# Patient Record
Sex: FEMALE | Race: WHITE
[De-identification: names, ages, dates, MRNs, and addresses within clinical notes are randomized per-mention and may not be internally consistent; named-entity substitution may affect disease eponyms.]

---

## 2020-06-22 ENCOUNTER — HOSPITAL ENCOUNTER (OUTPATIENT)
Dept: HOSPITAL 76 - DI | Age: 30
Discharge: HOME | End: 2020-06-22
Attending: OBSTETRICS & GYNECOLOGY
Payer: COMMERCIAL

## 2020-06-22 DIAGNOSIS — Z32.01: Primary | ICD-10-CM

## 2020-06-22 PROCEDURE — 76801 OB US < 14 WKS SINGLE FETUS: CPT

## 2020-06-23 NOTE — ULTRASOUND REPORT
PROCEDURE:  OB First Trimester

 

INDICATIONS:  POSITIVE PREGNANCY TEST

 

OUTSIDE/PRIOR DATING DATA:  

Last menstrual period (LMP): 4/11/2020.  

LMP-based estimated date of delivery (KAREEN): 1/16/2021.  

First dating scan (date and location): 6/22/2020.  

Estimated date of delivery (KAREEN) from first dating scan: 1/16/2021.  

 

TECHNIQUE:  

Real-time scanning was performed of the fetus and maternal pelvic organs, with image documentation.  


 

COMPARISON:  None

 

FINDINGS:     

 

Embryo:  Single live intrauterine pregnancy is identified with fetal heart rate of 163 bpm. Crown-rum
p length measures 3.8 cm corresponding to 10 weeks 2 days, compared to initial ultrasound of 10 weeks
 5 days.

 

Measurement variability in dating:  +/- 4 weeks by LMP, +/- 7 days by mean sac diameter (use before 6
 weeks gestation if crown-rump length not able to be measured), +/- 5 days by crown-rump length (6-12
 weeks gestation).  

 

Maternal organs:  Ovaries are unremarkable.  Limited images through the kidneys demonstrate no hydron
ephrosis.  

 

IMPRESSION:  

 

1. Single live intrauterine pregnancy responding to 10 weeks 2 days compared to initial ultrasound of
 10 weeks 5 days. Ultrasound KAREEN is unchanged at 1/16/2021.

 

 

2. Follow-up imaging at 20-22 weeks for dates and anatomy.

 

Reviewed by: Emily Chappell MD on 6/23/2020 1:21 PM PDT

Approved by: Emily Chappell MD on 6/23/2020 1:21 PM PDT

 

 

Station ID:  [id number]

## 2020-06-29 ENCOUNTER — HOSPITAL ENCOUNTER (OUTPATIENT)
Dept: HOSPITAL 76 - LAB.R | Age: 30
Discharge: HOME | End: 2020-06-29
Attending: OBSTETRICS & GYNECOLOGY
Payer: MEDICAID

## 2020-06-29 ENCOUNTER — HOSPITAL ENCOUNTER (OUTPATIENT)
Dept: HOSPITAL 76 - LAB | Age: 30
Discharge: HOME | End: 2020-06-29
Attending: OBSTETRICS & GYNECOLOGY
Payer: MEDICAID

## 2020-06-29 DIAGNOSIS — O99.280: Primary | ICD-10-CM

## 2020-06-29 DIAGNOSIS — Z3A.00: ICD-10-CM

## 2020-06-29 DIAGNOSIS — Z36.89: ICD-10-CM

## 2020-06-29 DIAGNOSIS — Z36.9: ICD-10-CM

## 2020-06-29 DIAGNOSIS — E03.9: ICD-10-CM

## 2020-06-29 LAB
BASOPHILS NFR BLD AUTO: 0 10^3/UL (ref 0–0.1)
BASOPHILS NFR BLD AUTO: 0.3 %
CLARITY UR REFRACT.AUTO: CLEAR
CLARITY UR REFRACT.AUTO: CLEAR
EOSINOPHIL # BLD AUTO: 0.1 10^3/UL (ref 0–0.7)
EOSINOPHIL NFR BLD AUTO: 1.1 %
ERYTHROCYTE [DISTWIDTH] IN BLOOD BY AUTOMATED COUNT: 12.2 % (ref 12–15)
GLUCOSE UR QL STRIP.AUTO: NEGATIVE MG/DL
GLUCOSE UR QL STRIP.AUTO: NEGATIVE MG/DL
HGB UR QL STRIP: 13.1 G/DL (ref 12–16)
KETONES UR QL STRIP.AUTO: NEGATIVE MG/DL
KETONES UR QL STRIP.AUTO: NEGATIVE MG/DL
LYMPHOCYTES # SPEC AUTO: 1.7 10^3/UL (ref 1.5–3.5)
LYMPHOCYTES NFR BLD AUTO: 23.1 %
MCH RBC QN AUTO: 30.2 PG (ref 27–31)
MCHC RBC AUTO-ENTMCNC: 33.8 G/DL (ref 32–36)
MCV RBC AUTO: 89.4 FL (ref 81–99)
MONOCYTES # BLD AUTO: 0.5 10^3/UL (ref 0–1)
MONOCYTES NFR BLD AUTO: 7.1 %
NEUTROPHILS # BLD AUTO: 4.8 10^3/UL (ref 1.5–6.6)
NEUTROPHILS # SNV AUTO: 7.1 X10^3/UL (ref 4.8–10.8)
NEUTROPHILS NFR BLD AUTO: 67.7 %
NITRITE UR QL STRIP.AUTO: NEGATIVE
NITRITE UR QL STRIP.AUTO: NEGATIVE
PDW BLD AUTO: 9.7 FL (ref 7.9–10.8)
PH UR STRIP.AUTO: 7 PH (ref 5–7.5)
PH UR STRIP.AUTO: 8 PH (ref 5–7.5)
PLATELET # BLD: 222 10^3/UL (ref 130–450)
PROT UR STRIP.AUTO-MCNC: NEGATIVE MG/DL
PROT UR STRIP.AUTO-MCNC: NEGATIVE MG/DL
RBC # UR STRIP.AUTO: NEGATIVE /UL
RBC # UR STRIP.AUTO: NEGATIVE /UL
RBC # URNS HPF: (no result) /HPF (ref 0–5)
RBC MAR: 4.34 10^6/UL (ref 4.2–5.4)
SP GR UR STRIP.AUTO: 1.01 (ref 1–1.03)
SP GR UR STRIP.AUTO: 1.02 (ref 1–1.03)
SQUAMOUS URNS QL MICRO: (no result)
SQUAMOUS URNS QL MICRO: (no result)
UROBILINOGEN UR QL STRIP.AUTO: (no result) E.U./DL
UROBILINOGEN UR QL STRIP.AUTO: (no result) E.U./DL
UROBILINOGEN UR STRIP.AUTO-MCNC: NEGATIVE MG/DL
UROBILINOGEN UR STRIP.AUTO-MCNC: NEGATIVE MG/DL

## 2020-06-29 PROCEDURE — 87086 URINE CULTURE/COLONY COUNT: CPT

## 2020-06-29 PROCEDURE — 86900 BLOOD TYPING SEROLOGIC ABO: CPT

## 2020-06-29 PROCEDURE — 84443 ASSAY THYROID STIM HORMONE: CPT

## 2020-06-29 PROCEDURE — 86901 BLOOD TYPING SEROLOGIC RH(D): CPT

## 2020-06-29 PROCEDURE — 86850 RBC ANTIBODY SCREEN: CPT

## 2020-06-29 PROCEDURE — 86592 SYPHILIS TEST NON-TREP QUAL: CPT

## 2020-06-29 PROCEDURE — 81599 UNLISTED MAAA: CPT

## 2020-06-29 PROCEDURE — 81001 URINALYSIS AUTO W/SCOPE: CPT

## 2020-06-29 PROCEDURE — 85025 COMPLETE CBC W/AUTO DIFF WBC: CPT

## 2020-06-29 PROCEDURE — 87389 HIV-1 AG W/HIV-1&-2 AB AG IA: CPT

## 2020-06-29 PROCEDURE — 87340 HEPATITIS B SURFACE AG IA: CPT

## 2020-06-29 PROCEDURE — 86762 RUBELLA ANTIBODY: CPT

## 2020-06-29 PROCEDURE — 36415 COLL VENOUS BLD VENIPUNCTURE: CPT

## 2020-06-29 PROCEDURE — 86803 HEPATITIS C AB TEST: CPT

## 2020-06-30 LAB
HEPATITIS B SURFACE ANTIGEN: (no result)
HEPATITIS C ANTIBODY: (no result)
HIV AG/AB 4TH GEN: (no result)
SIGNAL TO CUT-OFF: 0 (ref ?–1)

## 2020-08-26 ENCOUNTER — HOSPITAL ENCOUNTER (OUTPATIENT)
Dept: HOSPITAL 76 - DI | Age: 30
Discharge: HOME | End: 2020-08-26
Attending: OBSTETRICS & GYNECOLOGY
Payer: MEDICAID

## 2020-08-26 DIAGNOSIS — Z34.80: Primary | ICD-10-CM

## 2020-08-26 DIAGNOSIS — Z36.89: ICD-10-CM

## 2020-08-26 PROCEDURE — 76811 OB US DETAILED SNGL FETUS: CPT

## 2020-08-27 NOTE — ULTRASOUND REPORT
PROCEDURE:  OB Detailed Fetal Eval

 

INDICATIONS:  SUPERVISION OF PREGNANCY

 

OUTSIDE/PRIOR DATING DATA:  

Last menstrual period (LMP): 4/11/2020.  

LMP-based estimated date of delivery (KAREEN): 1/16/2021.  

First dating scan (date and location): 6/22/20.  

Estimated date of delivery (KAREEN) from first dating scan: 1/16/2021.  

 

TECHNIQUE: 

Real-time scanning was performed of the fetus, with image documentation and biometric measurements.  


 

COMPARISON:  6/22/2020

 

FINDINGS:     

 

General:  A single living intrauterine gestation is present.  

Presentation: Variable.

Placenta:  Placental position is anterior, without previa.  

Amniotic fluid index:  15 cm,  58th  percentile for gestational age.  Largest pocket 4.6 cm.

Fetal heart rate:  144 beats per minute.  

Maternal cervical canal:  5.4 cm long; normal length is 2.5 cm or more.  

 

Fetal biometrics:  

Biparietal diameter:  4.4 cm 19 weeks 3 days

Head circumference:  16.7 cm 19 weeks 2 days

Abdominal circumference:  14.7 cm 20 days

Femur length:  3.0 cm 19 weeks 2 days

Estimated gestational age from initial scan: 20 weeks 0 days

Composite gestational age from present scan:  19 weeks 4 days

Estimated fetal weight and percentile:  30 4 g 26th percentile

Measurement variability in biometric dating: +/- 10 days from 12-20 weeks gestation, +/- 2 weeks from
 20-30 weeks gestation, +/- 3 weeks at 30 weeks gestation or later.  

 

Anatomic survey:  

Neuro:  Ventricles are normal at less than 10 mm.  Cisterna magna is normal at 3-11 mm.  Cerebellum i
s normal in size and morphology.  

Nuchal skin fold:  Normal at less than 6 mm between 14 and 20 weeks gestational age.  

Face:  Nose and lips, facial profile are normal.  

Spine: Not well seen.

Heart:  4-chambered heart is present, with normal ventricular outflow tracts.  

Diaphragm:  Diaphragm is intact.  

Stomach:  Left-sided stomach is present.  

Kidneys:  No fetal hydronephrosis.  Normal is less than 5 mm in 2nd trimester, less than 7 mm in 3rd 
trimester.  

Cord:  3 vessel cord has orthotopic insertion.  

Bladder:  Normal in size.  

Extremities:  All 4 extremities are visualized.  

 

IMPRESSION:  

 

 

 

1. Single live intrauterine pregnancy with ultrasound gestational age today of 19 weeks 4 days corres
ponding to initial ultrasound date of 20 weeks 0 days. Ultrasound KAREEN is unchanged at 1/16/2021 from 
initial exam on 6/22/2020.

 

 

2. Spine is not well seen. Recommend interval follow-up for further evaluation.

 

Reviewed by: Emily Chappell MD on 8/27/2020 5:21 PM PDT

Approved by: Emily Chappell MD on 8/27/2020 5:21 PM PDT

 

 

Station ID:  [id number]

## 2020-09-20 ENCOUNTER — HOSPITAL ENCOUNTER (OUTPATIENT)
Dept: HOSPITAL 76 - DI | Age: 30
Discharge: HOME | End: 2020-09-20
Attending: OBSTETRICS & GYNECOLOGY
Payer: MEDICAID

## 2020-09-20 DIAGNOSIS — Z34.80: Primary | ICD-10-CM

## 2020-09-20 PROCEDURE — 76816 OB US FOLLOW-UP PER FETUS: CPT

## 2020-09-20 NOTE — ULTRASOUND REPORT
PROCEDURE:  OB F/U or Repeat

 

INDICATIONS:  SUPERVISION OF NORMAL MULTIGRAVIDA PREGNANCY

 

OUTSIDE/PRIOR DATING DATA:  

Last menstrual period (LMP): 4/11/2020. 

LMP-based estimated date of delivery (KAREEN): 1/16/2021. 

First dating scan (date and location): 6/22/20. 

Estimated date of delivery (KAREEN) from first dating scan: 1/16/2021.

 

TECHNIQUE: 

Real-time scanning was performed of the fetus, with image documentation.

Endovaginal scanning:  Not done

 

COMPARISON:  7/22/2020, 8/22/2020

 

FINDINGS:  

 

General:  A single live intrauterine gestation is present.  

Presentation:  Cephalic

Placenta:  Placental position is anterior, without previa.  

Amniotic fluid index:  14.1 cm, 43rd percentile for gestational age.  

Fetal heart rate:  140 beats per minute.  

Maternal cervical canal:  6.2 cm long; normal length is 2.5 cm or more.  

 

Other:  The fetal spine now demonstrates a normal appearance. The fetal stomach and diaphragm are unr
emarkable.

 

 

 

IMPRESSION:  The fetal spine now demonstrates an unremarkable appearance.

 

Reviewed by: Aiden Benz MD on 9/20/2020 3:27 PM JACKELYN

Approved by: Aiden Benz MD on 9/20/2020 3:27 PM JACKELYN

 

 

Station ID:  [id number]

## 2020-10-02 ENCOUNTER — HOSPITAL ENCOUNTER (OUTPATIENT)
Dept: HOSPITAL 76 - LAB.R | Age: 30
Discharge: HOME | End: 2020-10-02
Attending: OBSTETRICS & GYNECOLOGY
Payer: MEDICAID

## 2020-10-02 DIAGNOSIS — Z11.3: Primary | ICD-10-CM

## 2020-10-02 LAB — T VAGINALIS RRNA GENITAL QL PROBE: NEGATIVE

## 2020-10-02 PROCEDURE — 87591 N.GONORRHOEAE DNA AMP PROB: CPT

## 2020-10-02 PROCEDURE — 87491 CHLMYD TRACH DNA AMP PROBE: CPT

## 2020-10-02 PROCEDURE — 87661 TRICHOMONAS VAGINALIS AMPLIF: CPT

## 2020-10-21 ENCOUNTER — HOSPITAL ENCOUNTER (OUTPATIENT)
Dept: HOSPITAL 76 - LAB | Age: 30
Discharge: HOME | End: 2020-10-21
Attending: OBSTETRICS & GYNECOLOGY
Payer: MEDICAID

## 2020-10-21 DIAGNOSIS — E03.9: Primary | ICD-10-CM

## 2020-10-21 DIAGNOSIS — Z36.89: ICD-10-CM

## 2020-10-21 DIAGNOSIS — F43.10: ICD-10-CM

## 2020-10-21 LAB
ERYTHROCYTE [DISTWIDTH] IN BLOOD BY AUTOMATED COUNT: 12.5 % (ref 12–15)
HGB UR QL STRIP: 12.3 G/DL (ref 12–16)
MCH RBC QN AUTO: 31.5 PG (ref 27–31)
MCHC RBC AUTO-ENTMCNC: 34.3 G/DL (ref 32–36)
MCV RBC AUTO: 91.8 FL (ref 81–99)
NEUTROPHILS # SNV AUTO: 7.9 X10^3/UL (ref 4.8–10.8)
PDW BLD AUTO: 9.6 FL (ref 7.9–10.8)
PLATELET # BLD: 177 10^3/UL (ref 130–450)
RBC MAR: 3.91 10^6/UL (ref 4.2–5.4)

## 2020-10-21 PROCEDURE — 85027 COMPLETE CBC AUTOMATED: CPT

## 2020-10-21 PROCEDURE — 36415 COLL VENOUS BLD VENIPUNCTURE: CPT

## 2020-10-21 PROCEDURE — 82950 GLUCOSE TEST: CPT

## 2020-10-21 PROCEDURE — 82306 VITAMIN D 25 HYDROXY: CPT

## 2020-10-21 PROCEDURE — 84443 ASSAY THYROID STIM HORMONE: CPT

## 2020-12-09 ENCOUNTER — HOSPITAL ENCOUNTER (OUTPATIENT)
Dept: HOSPITAL 76 - LAB | Age: 30
Discharge: HOME | End: 2020-12-09
Attending: OBSTETRICS & GYNECOLOGY
Payer: COMMERCIAL

## 2020-12-09 DIAGNOSIS — Z34.80: Primary | ICD-10-CM

## 2020-12-09 DIAGNOSIS — E55.9: ICD-10-CM

## 2020-12-09 DIAGNOSIS — E03.9: ICD-10-CM

## 2020-12-09 PROCEDURE — 84443 ASSAY THYROID STIM HORMONE: CPT

## 2020-12-09 PROCEDURE — 87340 HEPATITIS B SURFACE AG IA: CPT

## 2020-12-09 PROCEDURE — 82306 VITAMIN D 25 HYDROXY: CPT

## 2020-12-11 LAB — HEPATITIS B SURFACE ANTIGEN: (no result)

## 2020-12-22 ENCOUNTER — HOSPITAL ENCOUNTER (OUTPATIENT)
Dept: HOSPITAL 76 - LAB.R | Age: 30
Discharge: HOME | End: 2020-12-22
Attending: OBSTETRICS & GYNECOLOGY
Payer: COMMERCIAL

## 2020-12-22 DIAGNOSIS — Z36.89: Primary | ICD-10-CM

## 2020-12-22 PROCEDURE — 87797 DETECT AGENT NOS DNA DIR: CPT

## 2021-01-16 ENCOUNTER — HOSPITAL ENCOUNTER (INPATIENT)
Dept: HOSPITAL 76 - WFO | Age: 31
LOS: 1 days | Discharge: HOME | End: 2021-01-17
Attending: OBSTETRICS & GYNECOLOGY | Admitting: OBSTETRICS & GYNECOLOGY
Payer: COMMERCIAL

## 2021-01-16 DIAGNOSIS — Z87.59: ICD-10-CM

## 2021-01-16 DIAGNOSIS — Z3A.40: ICD-10-CM

## 2021-01-16 DIAGNOSIS — L91.8: ICD-10-CM

## 2021-01-16 DIAGNOSIS — Z20.822: ICD-10-CM

## 2021-01-16 DIAGNOSIS — E03.9: ICD-10-CM

## 2021-01-16 DIAGNOSIS — F41.9: ICD-10-CM

## 2021-01-16 LAB
BASOPHILS NFR BLD AUTO: 0 10^3/UL (ref 0–0.1)
BASOPHILS NFR BLD AUTO: 0.2 %
EOSINOPHIL # BLD AUTO: 0.1 10^3/UL (ref 0–0.7)
EOSINOPHIL NFR BLD AUTO: 1 %
ERYTHROCYTE [DISTWIDTH] IN BLOOD BY AUTOMATED COUNT: 12.8 % (ref 12–15)
HGB UR QL STRIP: 12.6 G/DL (ref 12–16)
LYMPHOCYTES # SPEC AUTO: 2.2 10^3/UL (ref 1.5–3.5)
LYMPHOCYTES NFR BLD AUTO: 23.4 %
MCH RBC QN AUTO: 30.5 PG (ref 27–31)
MCHC RBC AUTO-ENTMCNC: 34 G/DL (ref 32–36)
MCV RBC AUTO: 89.8 FL (ref 81–99)
MONOCYTES # BLD AUTO: 0.9 10^3/UL (ref 0–1)
MONOCYTES NFR BLD AUTO: 9.9 %
NEUTROPHILS # BLD AUTO: 6 10^3/UL (ref 1.5–6.6)
NEUTROPHILS # SNV AUTO: 9.4 X10^3/UL (ref 4.8–10.8)
NEUTROPHILS NFR BLD AUTO: 63.9 %
PDW BLD AUTO: 10.6 FL (ref 7.9–10.8)
PLATELET # BLD: 163 10^3/UL (ref 130–450)
RBC MAR: 4.13 10^6/UL (ref 4.2–5.4)

## 2021-01-16 PROCEDURE — 86901 BLOOD TYPING SEROLOGIC RH(D): CPT

## 2021-01-16 PROCEDURE — 86900 BLOOD TYPING SEROLOGIC ABO: CPT

## 2021-01-16 PROCEDURE — 86920 COMPATIBILITY TEST SPIN: CPT

## 2021-01-16 PROCEDURE — 87635 SARS-COV-2 COVID-19 AMP PRB: CPT

## 2021-01-16 PROCEDURE — 85025 COMPLETE CBC W/AUTO DIFF WBC: CPT

## 2021-01-16 PROCEDURE — 86850 RBC ANTIBODY SCREEN: CPT

## 2021-01-16 PROCEDURE — 0KQM0ZZ REPAIR PERINEUM MUSCLE, OPEN APPROACH: ICD-10-PCS | Performed by: OBSTETRICS & GYNECOLOGY

## 2021-01-16 PROCEDURE — 10D17Z9 MANUAL EXTRACTION OF PRODUCTS OF CONCEPTION, RETAINED, VIA NATURAL OR ARTIFICIAL OPENING: ICD-10-PCS | Performed by: OBSTETRICS & GYNECOLOGY

## 2021-01-16 RX ADMIN — Medication PRN MLS/HR: at 07:18

## 2021-01-16 RX ADMIN — IBUPROFEN PRN MG: 600 TABLET, FILM COATED ORAL at 09:35

## 2021-01-16 RX ADMIN — Medication PRN MLS/HR: at 09:10

## 2021-01-16 RX ADMIN — LEVOTHYROXINE SODIUM SCH MCG: 0.1 TABLET ORAL at 09:35

## 2021-01-16 RX ADMIN — IBUPROFEN PRN MG: 600 TABLET, FILM COATED ORAL at 21:54

## 2021-01-16 NOTE — DELIVERY NOTE
Delivery Note





- Labor


Labor: positive: Spontaneous





- Infant Delivery Method


Infant Delivery Method: positive: Spontaneous vaginal delivery





- Birth Presentation


Birth Presentation: positive: Vertex, SASHA - right occiput anterior





- Nuchal Cord


Nuchal Cord: positive: None





- Anesthetic


Anesthetic Type: 





- Amniotic Fluid Description


Amniotic Fluid Description: positive: Clear





- Episiotomy Type


Episiotomy Type: positive: None, Other (Removed skin tag at introitus per 

patient request)





- Laceration


Laceration: positive: 2nd degree





- Suture


Suture Type: positive: Vicryl


Suture Size: positive: 3-0





- Delivery Outcome


Delivery Outcome: positive: Livebirth





- [place]


: positive: Placed in direct skin contact with mother, Stimulated, 

Warmed, [place] used


 sex: positive: Female





- Cord


Cord: positive: 3 vessels





- Placenta


Placenta: positive: Intact, Manual removal





- Estimated Blood Loss


Estimated Blood Loss (in cc): 400





- Post Delivery Events


Post Delivery Events: positive: No post delivery events





- Delivery Comments (Free Text/Narrative)


Delivery Comments (Free Text/Narrative): 





Patient is a 31 yo  at 40+0 wga who presented in labor. 


Hx of uterine inversion at prior delivery complicated by PPH with transfusion 

and 3rd degree laceration. 





STAGE I: Patient presented at approximately 5:30 am in active labor. Initial SVE

was 6/C/-1 station per RN exam. Epidural was placed for pain management. GBS 

negative, antibiotics were not indicated. No augmentation was indicated.  She 

progressed to complete dilation at 6:47 am. She reported passage of gross 

leakage of fluid at 6:45 am, which was clear and absent of meconium staining. 

Cat I tracing throughout stage I labor. 





STAGE II: Patient started pushing at 6:57 and delivered a viable female infant 

was 7:04 am. Infant delivered form vertex, SASHA presentation, She was rotated to 

direct OA during delivery and shoulders delivered without difficulty. Weight and

Apgars are pending. Infant was delivered to maternal abdomen. Umbilical cord was

short but no nuchal cord was noted. Cord was clamped x2 and cut after pulsations

had ceased. 





STAGE III: Placenta delivered at 7:16. Care was taken to avoid traction on the 

umbilical cord given history of uterine inversion. Initial attempts at expressed

with fundal massage was attempted. However, maternal blood loss was active and 

delivery needed to be expedited. In and out catheterization was perfomed to 

empty bladder. Manual extraction of the placenta was performed, instigating 

removal of an intact placenta. Second manual sweep of the uterine cavity was 

performed to ensure removal of all  placental tissue. Cefazolin 2g IV given x1 

as a prophylactic measure. The placenta was examined and found to be intact. 

Pitocin was started after removal of placenta. Inspection of the perineum 

revealed a second degree laceration repaired in the usual sterile fashion in lay

ers using 3-0 Vicryl. Good hemostasis was noted.  Total  cc

## 2021-01-16 NOTE — ANESTHESIA
Pre-Anesthesia VS, & Labs





- Diagnosis





active labor





- Procedure





vaginal delivery


Vital Signs: 





                                        











Temp Pulse Resp BP Pulse Ox


 


 37.0 C   85   18   130/75    


 


 21 05:30  21 05:30  21 05:30  21 05:30   











Height: 5 ft 10.75 in


Weight (kg): 106.141 kg


Body Mass Index: 32.8


BMI Classification: Obese





- NPO


Other (labor)





- Pregnancy


Is Patient Pregnant?: Yes





- Lab Results


Current Lab Results: 





Laboratory Tests





21 05:23: Blood Type O POSITIVE, Antibody Screen NEGATIVE, Crossmatch IS 

Only See Detail


21 05:23: WBC 9.4, RBC 4.13 L, Hgb 12.6, Hct 37.1, MCV 89.8, MCH 30.5, 

MCHC 34.0, RDW 12.8, Plt Count 163, MPV 10.6, Neut # (Auto) 6.0, Lymph # (Auto) 

2.2, Mono # (Auto) 0.9, Eos # (Auto) 0.1, Baso # (Auto) 0.0, Absolute Nucleated 

RBC 0.00, Nucleated RBC % 0.0








Fish Bones: 


                                 21 05:23








Home Medications and Allergies





Active Medications





Carboprost Tromethamine (Carboprost Tromethamine 250 Mcg/Ml Amp)  250 mcg IM 

Q15M PRN


   PRN Reason: Step 4: Hemorrhage protocol


   Stop: 21 05:05


Oxytocin/Sodium Chloride (Pitocin/Sodium Chloride)  500 mls @ 999 mls/hr IV PRN 

PRN; Protocol


   PRN Reason: POST-PARTUM HEMORR PREVENTION


   Stop: 21 05:05


Tranexamic Acid 1,000 mg/ (Sodium Chloride)  110 mls @ 660 mls/hr IV .ONCE PRN


   PRN Reason: EBL >1200mL and within 3hr


   Stop: 21 05:05


Lactated Ringer's (Lr)  1,000 mls @ 150 mls/hr IV .Q6H40M EVELINE


   Last Admin: 21 06:08 Dose:  150 mls/hr


   Documented by: 


Nitroglycerin (Nitroglycerin)  50 mg in 250 mls @ 0 mls/hr IV .Q0M ONE; Protocol


   Stop: 21 07:01


Lidocaine HCl (Lidocaine-Mpf 1% 30 Ml Vial)  30 ml ID .ONCE PRN


   PRN Reason: PERINEAL REPAIR


   Stop: 21 05:05


Methylergonovine Maleate (Methylergonovine 0.2 Mg/Ml Vial)  0.2 mg IM .ONCE PRN


   PRN Reason: Step 2: Hemorrhage protocol


   Stop: 21 05:05


Misoprostol (Misoprostol 200 Mcg Tablet)  800 mcg BC .ONCE PRN


   PRN Reason: Step 3: Hemorrhage protocol


   Stop: 21 05:05


Oxytocin (Oxytocin 10 Unit/Ml Vial)  10 unit IM .ONCE PRN


   PRN Reason: Step one: If no IV access


   Stop: 21 05:05


Sodium Chloride (Sodium Chloride Flush 0.9% 10 Ml Syringe)  10 ml IVP PRN PRN


   PRN Reason: AS NEEDED PER PROVIDER ORDERS





                                        





Levothyroxine [Synthroid] 75 mcg PO QDAC 19 


lamoTRIgine [Lamictal] 200 mg PO 19 








Allergies/Adverse Reactions: 


                                    Allergies











Allergy/AdvReac Type Severity Reaction Status Date / Time


 


No Known Drug Allergies Allergy   Verified 19 12:34














Anes History & Medical History





- Anesthetic History


Anesthesia Complications: reports: No previous complications





- Medical History


Cardiovascular: reports: None


Pulmonary: reports: None


Gastrointestinal: reports: None


Urinary: reports: None


Neuro: reports: None


Musculoskeletal: reports: None


Endocrine/Autoimmune: reports: HyPOthyroidism


Blood Disorders: reports: None


Skin: reports: None


Smoking Status: Never smoker


Psychosocial: reports: Anxiety, Other (ptsd from traumatic delivery)


History of Cancer?: No





- Surgical History


Gynecologic: Other (inverted uterus)


Orthopedic: Other





- Obstetrical History


: 2


Parity: 1


Prenatal Events: positive: None


Pregnancy Complications: positive: None





Exam


General: Alert, Oriented x3, Cooperative, No acute distress


Dental: WNL


Mouth Opening: 3 Fingerbreadth


Neck Mobility: Normal


Mallampati classification: II


Thyromental Distance: greater than 6 cm


Mental/Cognitive Status: Alert/Oriented X3, Normal for patient





Plan


Anesthesia Type: Epidural (OB/GYN asked for anesthesia standby during delivery 

due to history of inverted uterus.)


Consent for Procedure(s) Verified and Reviewed: Yes


Code Status: Attempt Resuscitation


ASA classification: 2-Mild systemic disease


Is this case an emergency?: No

## 2021-01-16 NOTE — HISTORY & PHYSICAL EXAMINATION
Prenatal Admit History





- Visit Reason


Visit Reason: Contractions





- Pregnancy


: 3


Parity: 1


Prenatal Care: positive: [facility]


Prenatal Risk/History: positive: High risk, Other (Hx of uterine inversion with 

PPH and 3rd degree laceration)


Smoking Status: Never smoker





- Mother's Labs


Mother's Blood Type: positive: O


Mother's RH: positive: Positive


GBS: positive: Group B Step Negative


Rubella Status: positive: Immune





- Other Maternal History


Other Maternal History: 





Patient is a 31 yo  at 40+0 wga here in active labor. 





Patient reports that contractions started yesterday and then petered out. They 

became more pain, intense,  and frequent at about 3 am. Denies LOF or VB. 

Endorses FM. 





Hx of uterine inversion at prior delivery. Very traumatic. Had to be transfused 

and then underwent repair of 3rd degree laceration while under GETA. She has a 

lot of anxiety. Desires epidural upon arrival. 





Low vit D levels on supplementation. 


Hypothyroid on levothyroxine. 











DATING:


LMP 2020 gives KAREEN 2021.


Ultrasounds on 2020 at 10 weeks 2 days gives KAREEN of 2021.








Vit D post supplementation- 29- marked improvement.


TSH 0.85





Wants to avoid Hep B immunization at birth for infant but wants to be checked 

again for exposure (although is low risk) - HepBsAg neg on epeat test





O pos/Rub imm


Quad screen reviewed. Declined all genetic testing and carrier testing. 

Confirmed 


Declined Gonorrhea and Chlamydia testing.


FAS: incomplete spine views. Anterior placenta. TVCL 5.2 EFW 26%ile


Glucola 130


Tdap- declined despite 


Influenza: Declines


GBS neg


HSV: Denies.


Breast pump: given


Mode of delivery: Anticipate .





Pap in 2020, will need repeat in .





History of obstetric trauma; referral to psychotherapy submitted at prior visit


Seeing chiropractic 


.





Meds/Allgy





- Home Medications


Home Medications: 


                                Ambulatory Orders











 Medication  Instructions  Recorded  Confirmed


 


Levothyroxine [Synthroid] 75 mcg PO QDAC 19


 


lamoTRIgine [Lamictal] 200 mg PO 19 














- Allergies


Allergies/Adverse Reactions: 


                                    Allergies











Allergy/AdvReac Type Severity Reaction Status Date / Time


 


No Known Drug Allergies Allergy   Verified 19 12:34














Review of Systems





- Other Findings


Other Findings: 





As per HPI, otherwise remaining systems are negative





Physical





- Abdominal Exam


Vital Signs: 





                                        











Temp Pulse Resp BP Pulse Ox


 


 98.6 F   85   18   130/75    


 


 21 05:30  21 05:30  21 05:30  21 05:30   











Contraction Frequency (min/apart): Q2-3 min


Contraction Intensity: positive: Moderate to strong





- Fetal Monitoring


Fetal Heart Rate Baseline: 130 mod davey 15x15 accels no decels


Fetal Strip Review: positive: Category I





- Presentation


Presentation: positive: Vertex





- Vaginal Exam


Membranes: positive: Membranes intact


Dilation (in cm): 6


Effacement (%): C


Station: positive: 0


Cervical Position: positive: Midposition





- Other Notes


Labor Progress Note/Additional Text: 





GEN: NAD


HEENT: NCAT


CV; RR


RESP: nl effort


ABD: gravid, soft, NT, ND


EXT: No LE edema








Plan for Labor





- Plan For Labor


Plan for Labor: 





31 yo  at 40+0 wga here with hx of uterine inversion admitted in labor





LABOR: 


Expectant management


Pitocin augmentation as indicated


AROM as indicated








FWB: Vertex, well grown, Cat I tracing, GBS neg








HX OF UTERINE INVERSION: 


-Do not pull on umbilical cord for delivery of placenta


-Have 50 mcg of IV nitroglycerin available at bedside


-CRNA aware of plan and intends to be present for delivery


-Pre-delivery briefing with L&D team with plan for action in setting of uterine 

inversion


-House supervisor to be on floor for delivery


-T&C for 4 units PRBC


-2 peripheral IVs inplace





PAIN: Desires epidural 





Anticipate

## 2021-01-17 VITALS — SYSTOLIC BLOOD PRESSURE: 111 MMHG | DIASTOLIC BLOOD PRESSURE: 62 MMHG

## 2021-01-17 RX ADMIN — ACETAMINOPHEN PRN MG: 500 TABLET ORAL at 09:52

## 2021-01-17 RX ADMIN — IBUPROFEN PRN MG: 600 TABLET, FILM COATED ORAL at 05:42

## 2021-01-17 RX ADMIN — ACETAMINOPHEN PRN MG: 500 TABLET ORAL at 01:18

## 2021-01-17 RX ADMIN — LEVOTHYROXINE SODIUM SCH MCG: 0.1 TABLET ORAL at 09:52

## 2021-01-17 NOTE — DISCHARGE PLAN
Discharge Plan


Problem Reviewed?: Yes


Disposition: 01 Home, Self Care


Condition: Good


Diet: Regular


Activity Restrictions: Additional Comments (Nothing in the vagina for 6 weeks: 

No intercourse, tampons, douching Call for:    -Fever greater than 100.5    -

Pain that does not improve with pain medication    -Heavy bleeding in which you 

are soaking a pad an hour for 2 hours in a row)


Shower Restrictions: No (No tub baths or hot tubs for 4 weeks)


Additional Instructions or Follow Up instructions: 


Ibuprofen 600 mg by mouth every 6 hours as needed for pain


Acetaminophen 500-1000 mg by mouth every 8 hours as needed for pain


Docusate 100-200 mg by mouth twice a day as needed for constipation


No Smoking: If you smoke, Please STOP!  Call [phone] for help.


Follow-up with: 


Karoline East MD [Provider Admit Priv/Credential] -

## 2021-01-17 NOTE — DISCHARGE SUMMARY
Discharge Summary


Admit Date: 21


Discharge Date: 21


Discharging Provider: Kita


Condition at Discharge: Good


Discharge Disposition: 01 Home, Self Care





- DIAGNOSES


Admission Diagnoses: 





IUP at 40+0 wga


Active labor


Hx of uterine inversion


Discharge Diagnoses with Status of Each Condition: 


Same and delivery of term gestation








- HPI


History of Present Illness: 





Patient is a 29 yo  at 40+0 wga who presented  in active labor. 





Patient reports that contractions started 1/15/2020 and then petered out. They 

became more pain, intense,  and frequent at about 3 am. Denied LOF or VB. 

Endorsed. FM. 


Hx of uterine inversion at prior delivery. Very traumatic. Had to be transfused 

and then underwent repair of 3rd degree laceration while under GETA. She has a 

lot of anxiety. Was dialted to 6 cm at presentation. Desired epidural upon 

arrival. 





Low vit D levels on supplementation. 


Hypothyroid on levothyroxine. 








- HOSPITAL COURSE


Hospital Course: 





Patient is a 29 yo  at 40+0 wga who presented in labor. 


Hx of uterine inversion at prior delivery complicated by PPH with transfusion 

and 3rd degree laceration. 





STAGE I: Patient presented at approximately 5:30 am in active labor. Initial SVE

was 6/C/-1 station per RN exam. Epidural was placed for pain management. GBS 

negative, antibiotics were not indicated. No augmentation was indicated.  She 

progressed to complete dilation at 6:47 am. She reported passage of gross 

leakage of fluid at 6:45 am, which was clear and absent of meconium staining. 

Cat I tracing throughout stage I labor. 





STAGE II: Patient started pushing at 6:57 and delivered a viable female infant 

was 7:04 am. Infant delivered form vertex, SASHA presentation, She was rotated to 

direct OA during delivery and shoulders delivered without difficulty. Weight and

Apgars are pending. Infant was delivered to maternal abdomen. Umbilical cord was

short but no nuchal cord was noted. Cord was clamped x2 and cut after pulsations

had ceased. 





STAGE III: Placenta delivered at 7:16. Care was taken to avoid traction on the 

umbilical cord given history of uterine inversion. Initial attempts at 

expressing placenta with fundal massage were not fully effective. However, 

maternal blood loss was active and delivery needed to be expedited. In and out 

catheterization was performed to empty bladder. Manual extraction of the 

placenta was performed, instigating removal of an intact placenta. Second manual

sweep of the uterine cavity was performed to ensure removal of all  placental 

tissue. Cefazolin 2g IV given x1 as a prophylactic measure. The placenta was 

examined and found to be intact. Pitocin was started after removal of placenta. 

Inspection of the perineum revealed a second degree laceration repaired in the 

usual sterile fashion in layers using 3-0 Vicryl. Good hemostasis was noted.  

Total  cc





Postpartum course was uncomplicated. Patient was meeting goals for discharge by 

PPD#1. Rh pos and Rubella immune. 





Discharged to home. Declined DC medications. 





- ALLERGIES


Allergies/Adverse Reactions: 


                                    Allergies











Allergy/AdvReac Type Severity Reaction Status Date / Time


 


No Known Drug Allergies Allergy   Verified 19 12:34














- MEDICATIONS


Home Medications: 


                                Ambulatory Orders











 Medication  Instructions  Recorded  Confirmed


 


Levothyroxine [Synthroid] 75 mcg PO QDAC 19


 


lamoTRIgine [Lamictal] 200 mg PO 19 














- LABS


Result Diagrams: 


                                 21 05:23








- FOLLOW UP


Follow Up: 





1 week and 6 weeks with Dr. East





- TIME SPENT


Time Spent in Discharge (Minutes): 30

## 2021-01-17 NOTE — PROVIDER PROGRESS NOTE
Subjective





- Prog Note Date


Prog Note Date: 21


Prog Note Time: 11:12





- Subjective


Subjective: 





Patient is up and ambulating, tolerating po, and voiding. Pain is well managed 

with pain medications.   Breast-feeding going well. Desires discharge to home. 

Declines discharge medications.





Objective





- Vital Signs/Intake & Output


Reviewed Vital Signs: Yes


Vital Signs: 


                                Vital Signs x48h











  Temp Pulse Resp BP Pulse Ox


 


 21 08:00  98.4 F  86  16  111/62  99


 


 21 05:00  98.2 F  78  16  118/72  99











Intake & Output: 


                                 Intake & Output











 01/14/21 01/15/21 01/16/21 01/17/21





 23:59 23:59 23:59 23:59


 


Intake Total   1571.25 


 


Output Total   1400 


 


Balance   171.25 














- Objective


General Appearance: positive: No acute distress


Respiratory: positive: No respiratory distress


Cardiovascular: positive: Other (RR)


Abdomen: positive: Other (Soft and non-tender, non-distended. FF below umbi)


Back: positive: Nml inspection


Skin: positive: Color nml


Extremities: positive: Non-tender, No pedal edema


Neurologic/Psychiatric: positive: Oriented x3





- Lab Results


Fish Bones: 


                                 21 05:23








Assessment/Plan





- Problem List


(1) Vaginal delivery


Impression: 


PPD#1 S/P 





Doing well 


Meeting goals for discharge


Routine DC instructions given


DC to home

## 2021-01-17 NOTE — LABOR FLOWSHEET
===================================

Labor Flowsheet

===================================

Datetime Report Generated by CPN: 01/17/2021 12:26

   

   

===========================

Datetime: 01/17/2021 08:40

===========================

   

   

===================================

VITAL SIGNS

===================================

   

 NBP Sys/Maria Del Carmen/Mean (mmHg):  111

:  62

:  74

 Pulse:  86

   

===========================

Datetime: 01/16/2021 11:05

===========================

   

 SpO2 (%):  100

   

===========================

Datetime: 01/16/2021 10:01

===========================

   

Stage of Pregnancy:  Recovery

   

===========================

Datetime: 01/16/2021 09:30

===========================

   

   

===================================

PAIN

===================================

   

 Pain Scale:  3

   

===========================

Datetime: 01/16/2021 08:05

===========================

   

 Membranes Ruptured Date/Time:  01/16/2021 06:45

 Amniotic Fluid Odor:  Normal

   

===========================

Datetime: 01/16/2021 07:45

===========================

   

 Respirations:  18

   

===========================

Datetime: 01/16/2021 07:19

===========================

   

   

===================================

MEDICATIONS

===================================

   

 Pitocin (milliunits):  Started @ 999

   

===========================

Datetime: 01/16/2021 07:15

===========================

   

 LaborFlag:  Labor

   

===========================

Datetime: 01/16/2021 07:13

===========================

   

 I/O Interventions:  Straight Cath (ml) @

   

===========================

Datetime: 01/16/2021 06:57

===========================

   

 R Baseline Rate :  120

 Variability:  Moderate 6-25 bpm

 Accelerations:  15X15

 Decelerations:  None

 Category:  Category I

 Pushing Position:  Pushing with Contractions

 Pushing Progress:  Descent with Pushing

   

===================================

COMMUNICATION

===================================

   

 Communication:  Provider at Bedside

 Provider Notified (Name):  Dr. East

 Communication Comments:  Report to dayshi RN

   

===========================

Datetime: 01/16/2021 06:50

===========================

   

   

===================================

STAGE 2

===================================

   

 Pushing:  Coached on Pushing

   

===========================

Datetime: 01/16/2021 06:47

===========================

   

   

===================================

UTERINE ACTIVITY

===================================

   

 Monitor Mode:  External

 Frequency (min):  3

 Quality:  Moderate

 Pattern:  Normal: <= 5 Contractions in 10 Minutes

 Resting Tone (Palpate):  Relaxed

   

===================================

FETAL ASSESSMENT A

===================================

   

 Monitor Mode:  Doppler

   

===================================

VAGINAL EXAM

===================================

   

 Dilatation (cm):  10.0

 Effacement (%):  100

 Station:  -1

 Exam by:  Dr. East

 Membrane Status:  Ruptured

 Membranes Rupture Method:  Spontaneous

 Amniotic Fluid Color:  Clear

 Amniotic Fluid Amount:  Small

 Vaginal Bleeding:  None

 Cervix, Consistency:  Soft

 Cervix, Position:  Anterior

 Membrane Comments:  [id number]

   

===========================

Datetime: 01/16/2021 06:12

===========================

   

   

===================================

MATERNAL ASSESSMENT

===================================

   

 Level of Consciousness:  Alert

 DTR's/Clonus:  DTRs 2+

 Headache:  Denies

 Breath Sounds, Left:  Clear and Equal

 Breath Sounds, Right:  Clear and Equal

 Nausea/Vomiting:  Denies

 RUQ Epigastric Pain:  Denies

 Anesthesia Level Check:  T10- Umbilicus

   

===========================

Datetime: 01/16/2021 06:09

===========================

   

 Contraction Comments:  difficulkty picking up contractions

   

===========================

Datetime: 01/16/2021 06:03

===========================

   

 Patient Position/Activity:  Right Tilt

   

===========================

Datetime: 01/16/2021 06:00

===========================

   

 Epidural Procedure:  Loading Dose

   

===========================

Datetime: 01/16/2021 05:54

===========================

   

   

===================================

PATIENT CARE

===================================

   

 IV/Blood Work:  IV Started; New IV Bag Hung; IV Bag Number @ 1

   

===========================

Datetime: 01/16/2021 05:49

===========================

   

   

===================================

PROCEDURE TIME OUT

===================================

   

 Procedure Verify:  Correct Patient Identity; Correct Side and Site are Marked; Accurate Procedure Co
nsent Form; Agreement on Procedure to be Done; Correct Patient Position

   

===================================

ANESTHESIA

===================================

   

 Anesthesia Plans:  Epidural

 Anesthesia Comments:  sitting for epidural